# Patient Record
Sex: MALE | Race: WHITE | HISPANIC OR LATINO | URBAN - METROPOLITAN AREA
[De-identification: names, ages, dates, MRNs, and addresses within clinical notes are randomized per-mention and may not be internally consistent; named-entity substitution may affect disease eponyms.]

---

## 2022-10-10 ENCOUNTER — OFFICE (OUTPATIENT)
Dept: URBAN - METROPOLITAN AREA CLINIC 18 | Facility: CLINIC | Age: 32
End: 2022-10-10
Payer: COMMERCIAL

## 2022-10-10 VITALS
SYSTOLIC BLOOD PRESSURE: 136 MMHG | DIASTOLIC BLOOD PRESSURE: 78 MMHG | WEIGHT: 273 LBS | HEIGHT: 66 IN | HEART RATE: 85 BPM

## 2022-10-10 DIAGNOSIS — K51.90 ULCERATIVE COLITIS, UNSPECIFIED, WITHOUT COMPLICATIONS: ICD-10-CM

## 2022-10-10 LAB
C-REACTIVE PROTEIN: 0.81 MG/DL — HIGH
CBC, PLATELET CT  AND  DIFF: ABS BASOPHIL: 0.1 K/UL
CBC, PLATELET CT  AND  DIFF: ABS EOSINOPHIL: 0.2 K/UL
CBC, PLATELET CT  AND  DIFF: ABS IMMATURE GRANS: 0 K/UL
CBC, PLATELET CT  AND  DIFF: ABS LYMPHOCYTE: 1.8 K/UL
CBC, PLATELET CT  AND  DIFF: ABS MONOCYTE: 0.5 K/UL
CBC, PLATELET CT  AND  DIFF: ABS NEUTROPHIL: 5.2 K/UL
CBC, PLATELET CT  AND  DIFF: BASOPHIL: 0.6 %
CBC, PLATELET CT  AND  DIFF: DIFFERENTIAL: (no result)
CBC, PLATELET CT  AND  DIFF: EOSINOPHIL: 2.3 %
CBC, PLATELET CT  AND  DIFF: HEMATOCRIT: 45.5 %
CBC, PLATELET CT  AND  DIFF: HEMOGLOBIN: 15.4 G/DL
CBC, PLATELET CT  AND  DIFF: IMMATURE GRANULOCYTES: 0.3 %
CBC, PLATELET CT  AND  DIFF: LYMPHOCYTE: 23 %
CBC, PLATELET CT  AND  DIFF: MCH: 29.2 PG
CBC, PLATELET CT  AND  DIFF: MCHC: 33.8 G/DL
CBC, PLATELET CT  AND  DIFF: MCV: 86.2 FL
CBC, PLATELET CT  AND  DIFF: MONOCYTE: 6.8 %
CBC, PLATELET CT  AND  DIFF: MPV: 9.3 FL
CBC, PLATELET CT  AND  DIFF: NEUTROPHIL: 67 %
CBC, PLATELET CT  AND  DIFF: NRBCS: 0 /100 WBC
CBC, PLATELET CT  AND  DIFF: PLATELET COUNT: 347 K/UL
CBC, PLATELET CT  AND  DIFF: RBC: 5.28 M/UL
CBC, PLATELET CT  AND  DIFF: RDW: 13.5 %
CBC, PLATELET CT  AND  DIFF: WBC COUNT: 7.8 K/UL
COMPREHENSIVE METABOLIC PANEL: A/G RATIO: 1.8 RATIO
COMPREHENSIVE METABOLIC PANEL: ALBUMIN: 4.5 G/DL
COMPREHENSIVE METABOLIC PANEL: ALK PHOSPHATASE: 92 U/L
COMPREHENSIVE METABOLIC PANEL: ALT: 42 U/L
COMPREHENSIVE METABOLIC PANEL: AST: 23 U/L
COMPREHENSIVE METABOLIC PANEL: BILIRUBIN,TOTAL: 0.3 MG/DL
COMPREHENSIVE METABOLIC PANEL: BLOOD UREA NITROGEN: 16 MG/DL
COMPREHENSIVE METABOLIC PANEL: BUN/CREAT RATIO: 18
COMPREHENSIVE METABOLIC PANEL: CALCIUM: 9.8 MG/DL
COMPREHENSIVE METABOLIC PANEL: CHLORIDE: 101 MEQ/L
COMPREHENSIVE METABOLIC PANEL: CO2: 31 MEQ/L
COMPREHENSIVE METABOLIC PANEL: CREATININE: 0.9 MG/DL
COMPREHENSIVE METABOLIC PANEL: FASTING STATUS: (no result)
COMPREHENSIVE METABOLIC PANEL: GLOBULIN: 2.5 G/DL
COMPREHENSIVE METABOLIC PANEL: GLOMERULAR FILTRATION RATE (GFR): 116 MLS/MIN/1.73M2
COMPREHENSIVE METABOLIC PANEL: GLUCOSE,RANDOM: 99 MG/DL
COMPREHENSIVE METABOLIC PANEL: POTASSIUM: 4.6 MEQ/L
COMPREHENSIVE METABOLIC PANEL: SODIUM: 139 MEQ/L
COMPREHENSIVE METABOLIC PANEL: TOTAL PROTEIN: 7 G/DL

## 2022-10-10 PROCEDURE — 99214 OFFICE O/P EST MOD 30 MIN: CPT | Performed by: INTERNAL MEDICINE

## 2022-10-10 RX ORDER — MESALAMINE 1.2 G/1
TABLET, DELAYED RELEASE ORAL
Qty: 60 | Refills: 11 | Status: ACTIVE

## 2022-12-15 ENCOUNTER — OFFICE (OUTPATIENT)
Dept: URBAN - METROPOLITAN AREA CLINIC 18 | Facility: CLINIC | Age: 32
End: 2022-12-15
Payer: COMMERCIAL

## 2022-12-15 VITALS
DIASTOLIC BLOOD PRESSURE: 77 MMHG | HEIGHT: 66 IN | SYSTOLIC BLOOD PRESSURE: 140 MMHG | HEART RATE: 117 BPM | OXYGEN SATURATION: 97 %

## 2022-12-15 DIAGNOSIS — K51.90 ULCERATIVE COLITIS, UNSPECIFIED, WITHOUT COMPLICATIONS: ICD-10-CM

## 2022-12-15 PROCEDURE — 99214 OFFICE O/P EST MOD 30 MIN: CPT | Performed by: INTERNAL MEDICINE

## 2022-12-15 RX ORDER — MESALAMINE 1000 MG/1
SUPPOSITORY RECTAL
Qty: 30 | Refills: 10 | Status: ACTIVE
Start: 2022-12-15

## 2023-09-25 ENCOUNTER — OFFICE (OUTPATIENT)
Dept: URBAN - METROPOLITAN AREA CLINIC 18 | Facility: CLINIC | Age: 33
End: 2023-09-25
Payer: COMMERCIAL

## 2023-09-25 VITALS
DIASTOLIC BLOOD PRESSURE: 74 MMHG | HEART RATE: 70 BPM | OXYGEN SATURATION: 98 % | HEIGHT: 66 IN | SYSTOLIC BLOOD PRESSURE: 121 MMHG | RESPIRATION RATE: 16 BRPM | WEIGHT: 266 LBS

## 2023-09-25 DIAGNOSIS — K51.90 ULCERATIVE COLITIS, UNSPECIFIED, WITHOUT COMPLICATIONS: ICD-10-CM

## 2023-09-25 PROCEDURE — 99214 OFFICE O/P EST MOD 30 MIN: CPT | Performed by: INTERNAL MEDICINE

## 2023-09-25 RX ORDER — BUDESONIDE 9 MG/1
9 TABLET, FILM COATED, EXTENDED RELEASE ORAL
Qty: 30 | Refills: 5 | Status: COMPLETED
Start: 2023-09-25 | End: 2024-01-30

## 2024-01-05 ENCOUNTER — OFFICE (OUTPATIENT)
Dept: URBAN - METROPOLITAN AREA CLINIC 18 | Facility: CLINIC | Age: 34
End: 2024-01-05
Payer: COMMERCIAL

## 2024-01-05 VITALS
WEIGHT: 266 LBS | OXYGEN SATURATION: 96 % | HEART RATE: 94 BPM | HEIGHT: 66 IN | SYSTOLIC BLOOD PRESSURE: 109 MMHG | RESPIRATION RATE: 16 BRPM | DIASTOLIC BLOOD PRESSURE: 74 MMHG

## 2024-01-05 DIAGNOSIS — K51.90 ULCERATIVE COLITIS, UNSPECIFIED, WITHOUT COMPLICATIONS: ICD-10-CM

## 2024-01-05 PROCEDURE — 99214 OFFICE O/P EST MOD 30 MIN: CPT | Performed by: INTERNAL MEDICINE

## 2024-01-05 RX ORDER — MESALAMINE 1.2 G/1
TABLET, DELAYED RELEASE ORAL
Qty: 60 | Refills: 11 | Status: ACTIVE

## 2024-01-30 ENCOUNTER — OFFICE (OUTPATIENT)
Dept: URBAN - METROPOLITAN AREA CLINIC 18 | Facility: CLINIC | Age: 34
End: 2024-01-30
Payer: COMMERCIAL

## 2024-01-30 VITALS
HEART RATE: 98 BPM | DIASTOLIC BLOOD PRESSURE: 76 MMHG | HEIGHT: 66 IN | SYSTOLIC BLOOD PRESSURE: 120 MMHG | WEIGHT: 259 LBS

## 2024-01-30 DIAGNOSIS — K51.90 ULCERATIVE COLITIS, UNSPECIFIED, WITHOUT COMPLICATIONS: ICD-10-CM

## 2024-01-30 PROCEDURE — 99214 OFFICE O/P EST MOD 30 MIN: CPT | Performed by: INTERNAL MEDICINE

## 2024-02-07 LAB
C DIFF SCREEN: C DIFFICILE SCREEN: NEGATIVE
REPORT COMMENT: (no result)

## 2024-02-14 ENCOUNTER — AMBULATORY SURGICAL CENTER (OUTPATIENT)
Dept: URBAN - METROPOLITAN AREA SURGERY 5 | Facility: SURGERY | Age: 34
End: 2024-02-14
Payer: COMMERCIAL

## 2024-02-14 ENCOUNTER — OFFICE (OUTPATIENT)
Dept: URBAN - METROPOLITAN AREA PATHOLOGY 1 | Facility: PATHOLOGY | Age: 34
End: 2024-02-14
Payer: COMMERCIAL

## 2024-02-14 VITALS
OXYGEN SATURATION: 96 % | WEIGHT: 250 LBS | SYSTOLIC BLOOD PRESSURE: 127 MMHG | RESPIRATION RATE: 10 BRPM | RESPIRATION RATE: 23 BRPM | RESPIRATION RATE: 17 BRPM | HEART RATE: 68 BPM | TEMPERATURE: 98.2 F | RESPIRATION RATE: 20 BRPM | RESPIRATION RATE: 16 BRPM | RESPIRATION RATE: 24 BRPM | RESPIRATION RATE: 24 BRPM | HEART RATE: 73 BPM | HEART RATE: 83 BPM | OXYGEN SATURATION: 94 % | SYSTOLIC BLOOD PRESSURE: 116 MMHG | HEART RATE: 83 BPM | OXYGEN SATURATION: 95 % | DIASTOLIC BLOOD PRESSURE: 69 MMHG | HEART RATE: 77 BPM | RESPIRATION RATE: 12 BRPM | HEART RATE: 79 BPM | SYSTOLIC BLOOD PRESSURE: 123 MMHG | OXYGEN SATURATION: 99 % | HEART RATE: 79 BPM | SYSTOLIC BLOOD PRESSURE: 119 MMHG | HEART RATE: 75 BPM | SYSTOLIC BLOOD PRESSURE: 127 MMHG | SYSTOLIC BLOOD PRESSURE: 116 MMHG | RESPIRATION RATE: 20 BRPM | HEART RATE: 68 BPM | SYSTOLIC BLOOD PRESSURE: 129 MMHG | DIASTOLIC BLOOD PRESSURE: 73 MMHG | OXYGEN SATURATION: 95 % | HEIGHT: 66 IN | SYSTOLIC BLOOD PRESSURE: 130 MMHG | DIASTOLIC BLOOD PRESSURE: 69 MMHG | OXYGEN SATURATION: 99 % | RESPIRATION RATE: 12 BRPM | HEART RATE: 75 BPM | SYSTOLIC BLOOD PRESSURE: 130 MMHG | TEMPERATURE: 98.2 F | RESPIRATION RATE: 17 BRPM | DIASTOLIC BLOOD PRESSURE: 70 MMHG | HEIGHT: 66 IN | RESPIRATION RATE: 10 BRPM | OXYGEN SATURATION: 94 % | RESPIRATION RATE: 21 BRPM | DIASTOLIC BLOOD PRESSURE: 53 MMHG | RESPIRATION RATE: 23 BRPM | SYSTOLIC BLOOD PRESSURE: 123 MMHG | OXYGEN SATURATION: 96 % | DIASTOLIC BLOOD PRESSURE: 62 MMHG | RESPIRATION RATE: 21 BRPM | DIASTOLIC BLOOD PRESSURE: 62 MMHG | DIASTOLIC BLOOD PRESSURE: 75 MMHG | HEART RATE: 78 BPM | SYSTOLIC BLOOD PRESSURE: 119 MMHG | DIASTOLIC BLOOD PRESSURE: 73 MMHG | SYSTOLIC BLOOD PRESSURE: 129 MMHG | WEIGHT: 250 LBS | DIASTOLIC BLOOD PRESSURE: 75 MMHG | HEART RATE: 77 BPM | DIASTOLIC BLOOD PRESSURE: 70 MMHG | HEART RATE: 73 BPM | OXYGEN SATURATION: 97 % | HEART RATE: 78 BPM | OXYGEN SATURATION: 97 % | RESPIRATION RATE: 16 BRPM | DIASTOLIC BLOOD PRESSURE: 53 MMHG

## 2024-02-14 DIAGNOSIS — K63.89 OTHER SPECIFIED DISEASES OF INTESTINE: ICD-10-CM

## 2024-02-14 DIAGNOSIS — K63.3 ULCER OF INTESTINE: ICD-10-CM

## 2024-02-14 DIAGNOSIS — K52.9 NONINFECTIVE GASTROENTERITIS AND COLITIS, UNSPECIFIED: ICD-10-CM

## 2024-02-14 DIAGNOSIS — K52.89 OTHER SPECIFIED NONINFECTIVE GASTROENTERITIS AND COLITIS: ICD-10-CM

## 2024-02-14 DIAGNOSIS — K51.90 ULCERATIVE COLITIS, UNSPECIFIED, WITHOUT COMPLICATIONS: ICD-10-CM

## 2024-02-14 PROCEDURE — 45380 COLONOSCOPY AND BIOPSY: CPT | Performed by: INTERNAL MEDICINE

## 2024-02-14 PROCEDURE — 88305 TISSUE EXAM BY PATHOLOGIST: CPT | Performed by: PATHOLOGY

## 2024-02-19 LAB
PDF: PDF REPORT: (no result)
PDF: PDF REPORT: (no result)

## 2024-10-03 ENCOUNTER — APPOINTMENT (OUTPATIENT)
Dept: URBAN - METROPOLITAN AREA CLINIC 205 | Age: 34
Setting detail: DERMATOLOGY
End: 2024-10-03

## 2024-10-03 DIAGNOSIS — B35.1 TINEA UNGUIUM: ICD-10-CM

## 2024-10-03 PROCEDURE — 99203 OFFICE O/P NEW LOW 30 MIN: CPT

## 2024-10-03 PROCEDURE — OTHER COUNSELING: OTHER

## 2024-10-03 PROCEDURE — OTHER PRESCRIPTION: OTHER

## 2024-10-03 PROCEDURE — OTHER TREATMENT REGIMEN: OTHER

## 2024-10-03 RX ORDER — CICLOPIROX 80 MG/ML
SOLUTION TOPICAL
Qty: 6.6 | Refills: 3 | Status: ERX | COMMUNITY
Start: 2024-10-03

## 2024-10-03 ASSESSMENT — LOCATION ZONE DERM: LOCATION ZONE: TOE

## 2024-10-03 ASSESSMENT — LOCATION SIMPLE DESCRIPTION DERM: LOCATION SIMPLE: RIGHT GREAT TOE

## 2024-10-03 ASSESSMENT — LOCATION DETAILED DESCRIPTION DERM: LOCATION DETAILED: RIGHT DORSAL GREAT TOE

## 2024-10-03 NOTE — HPI: EVALUATION OF SKIN LESION(S)
Hpi Title: Evaluation of a Skin Lesion
Additional History: Right 1st toenail is brittle and painful when clipped. Tried otc toenail solution. Has not helped.